# Patient Record
Sex: FEMALE | Race: BLACK OR AFRICAN AMERICAN | NOT HISPANIC OR LATINO | Employment: FULL TIME | URBAN - METROPOLITAN AREA
[De-identification: names, ages, dates, MRNs, and addresses within clinical notes are randomized per-mention and may not be internally consistent; named-entity substitution may affect disease eponyms.]

---

## 2017-10-27 ENCOUNTER — LAB SERVICES (OUTPATIENT)
Dept: LAB | Age: 37
End: 2017-10-27

## 2017-10-27 ENCOUNTER — OFFICE VISIT (OUTPATIENT)
Dept: OBGYN | Age: 37
End: 2017-10-27

## 2017-10-27 VITALS
WEIGHT: 247 LBS | DIASTOLIC BLOOD PRESSURE: 86 MMHG | BODY MASS INDEX: 36.58 KG/M2 | HEIGHT: 69 IN | HEART RATE: 86 BPM | SYSTOLIC BLOOD PRESSURE: 130 MMHG

## 2017-10-27 DIAGNOSIS — Z23 NEED FOR VACCINATION: ICD-10-CM

## 2017-10-27 DIAGNOSIS — Z11.3 SCREEN FOR STD (SEXUALLY TRANSMITTED DISEASE): ICD-10-CM

## 2017-10-27 DIAGNOSIS — Z12.4 SCREENING FOR CERVICAL CANCER: ICD-10-CM

## 2017-10-27 DIAGNOSIS — Z01.419 WELL FEMALE EXAM WITH ROUTINE GYNECOLOGICAL EXAM: Primary | ICD-10-CM

## 2017-10-27 PROCEDURE — 99385 PREV VISIT NEW AGE 18-39: CPT | Performed by: OBSTETRICS & GYNECOLOGY

## 2017-10-27 PROCEDURE — 90686 IIV4 VACC NO PRSV 0.5 ML IM: CPT | Performed by: OBSTETRICS & GYNECOLOGY

## 2017-10-27 RX ORDER — FLUCONAZOLE 150 MG/1
150 TABLET ORAL ONCE
Qty: 1 TABLET | Refills: 1 | Status: SHIPPED | OUTPATIENT
Start: 2017-10-27 | End: 2017-10-27

## 2017-10-31 LAB
C TRACH RRNA SPEC QL NAA+PROBE: NEGATIVE
N GONORRHOEA RRNA SPEC QL NAA+PROBE: NEGATIVE
SPECIMEN SOURCE: NORMAL

## 2017-11-01 LAB — HPV16+18+45 E6+E7MRNA CVX NAA+PROBE: NORMAL

## 2017-11-07 ENCOUNTER — TELEPHONE (OUTPATIENT)
Dept: OBGYN | Age: 37
End: 2017-11-07

## 2017-11-09 ENCOUNTER — OFFICE VISIT (OUTPATIENT)
Dept: OBGYN | Age: 37
End: 2017-11-09

## 2017-11-09 VITALS — BODY MASS INDEX: 36.58 KG/M2 | HEIGHT: 69 IN | HEART RATE: 104 BPM | WEIGHT: 247 LBS

## 2017-11-09 DIAGNOSIS — Z30.430 ENCOUNTER FOR INSERTION OF MIRENA IUD: Primary | ICD-10-CM

## 2017-11-09 DIAGNOSIS — Z30.430 ENCOUNTER FOR INSERTION OF PROGESTIN-RELEASING INTRAUTERINE CONTRACEPTIVE DEVICE: ICD-10-CM

## 2017-11-09 LAB
B-HCG UR QL: NEGATIVE
SP GR UR: NORMAL

## 2017-11-09 PROCEDURE — 81025 URINE PREGNANCY TEST: CPT | Performed by: OBSTETRICS & GYNECOLOGY

## 2017-11-09 PROCEDURE — 58300 INSERT INTRAUTERINE DEVICE: CPT | Performed by: OBSTETRICS & GYNECOLOGY

## 2019-01-09 ENCOUNTER — TELEPHONE (OUTPATIENT)
Dept: OBGYN | Age: 39
End: 2019-01-09

## 2019-01-09 DIAGNOSIS — R10.9 ABDOMINAL PAIN, UNSPECIFIED ABDOMINAL LOCATION: ICD-10-CM

## 2019-01-09 DIAGNOSIS — Z97.5 IUD (INTRAUTERINE DEVICE) IN PLACE: Primary | ICD-10-CM

## 2019-03-14 ENCOUNTER — OFFICE VISIT (OUTPATIENT)
Dept: PODIATRY | Age: 39
End: 2019-03-14

## 2019-03-14 VITALS
DIASTOLIC BLOOD PRESSURE: 70 MMHG | BODY MASS INDEX: 39.55 KG/M2 | WEIGHT: 252 LBS | SYSTOLIC BLOOD PRESSURE: 110 MMHG | HEIGHT: 67 IN

## 2019-03-14 DIAGNOSIS — L84 CORN OR CALLUS: Primary | ICD-10-CM

## 2019-03-14 PROCEDURE — 11056 PARNG/CUTG B9 HYPRKR LES 2-4: CPT | Performed by: PODIATRIST

## 2019-03-14 PROCEDURE — 99202 OFFICE O/P NEW SF 15 MIN: CPT | Performed by: PODIATRIST

## 2022-10-15 ENCOUNTER — HOSPITAL ENCOUNTER (EMERGENCY)
Facility: HOSPITAL | Age: 42
Discharge: HOME | End: 2022-10-15
Attending: EMERGENCY MEDICINE
Payer: MEDICARE

## 2022-10-15 VITALS
OXYGEN SATURATION: 98 % | BODY MASS INDEX: 38.14 KG/M2 | HEIGHT: 67 IN | SYSTOLIC BLOOD PRESSURE: 147 MMHG | RESPIRATION RATE: 18 BRPM | HEART RATE: 101 BPM | WEIGHT: 243 LBS | DIASTOLIC BLOOD PRESSURE: 98 MMHG | TEMPERATURE: 99 F

## 2022-10-15 DIAGNOSIS — R73.9 HYPERGLYCEMIA: Primary | ICD-10-CM

## 2022-10-15 LAB
AMPHET UR QL SCN: NEGATIVE
ANION GAP SERPL CALC-SCNC: 10 MMOL/L (ref 0–18)
APAP SERPL-MCNC: <2 UG/ML (ref 10–30)
BARBITURATES UR QL SCN: NEGATIVE
BASE EXCESS BLD CALC-SCNC: -1.3 MMOL/L (ref ?–2)
BASOPHILS # BLD AUTO: 0.06 X10(3) UL (ref 0–0.2)
BASOPHILS NFR BLD AUTO: 0.5 %
BENZODIAZ UR QL SCN: NEGATIVE
BILIRUB UR QL: NEGATIVE
BUN BLD-MCNC: 10 MG/DL (ref 7–18)
BUN/CREAT SERPL: 11.4 (ref 10–20)
CALCIUM BLD-MCNC: 9.2 MG/DL (ref 8.5–10.1)
CANNABINOIDS UR QL SCN: NEGATIVE
CHLORIDE SERPL-SCNC: 106 MMOL/L (ref 98–112)
CO2 SERPL-SCNC: 23 MMOL/L (ref 21–32)
COCAINE UR QL: NEGATIVE
COLOR UR: YELLOW
CREAT BLD-MCNC: 0.88 MG/DL
CREAT UR-SCNC: 73.8 MG/DL
DEPRECATED RDW RBC AUTO: 44.9 FL (ref 35.1–46.3)
EOSINOPHIL # BLD AUTO: 0.02 X10(3) UL (ref 0–0.7)
EOSINOPHIL NFR BLD AUTO: 0.2 %
ERYTHROCYTE [DISTWIDTH] IN BLOOD BY AUTOMATED COUNT: 14.8 % (ref 11–15)
ETHANOL SERPL-MCNC: <3 MG/DL (ref ?–3)
GFR SERPLBLD BASED ON 1.73 SQ M-ARVRAT: 84 ML/MIN/1.73M2 (ref 60–?)
GLUCOSE BLD-MCNC: 407 MG/DL (ref 70–99)
GLUCOSE BLDC GLUCOMTR-MCNC: 327 MG/DL (ref 70–99)
GLUCOSE BLDC GLUCOMTR-MCNC: 403 MG/DL (ref 70–99)
GLUCOSE UR-MCNC: >=500 MG/DL
HCO3 BLDV-SCNC: 23.8 MEQ/L (ref 22–26)
HCT VFR BLD AUTO: 39.3 %
HGB BLD-MCNC: 12.4 G/DL
IMM GRANULOCYTES # BLD AUTO: 0.07 X10(3) UL (ref 0–1)
IMM GRANULOCYTES NFR BLD: 0.5 %
KETONES UR-MCNC: 20 MG/DL
LYMPHOCYTES # BLD AUTO: 1.78 X10(3) UL (ref 1–4)
LYMPHOCYTES NFR BLD AUTO: 13.5 %
MCH RBC QN AUTO: 26.2 PG (ref 26–34)
MCHC RBC AUTO-ENTMCNC: 31.6 G/DL (ref 31–37)
MCV RBC AUTO: 82.9 FL
MDMA UR QL SCN: NEGATIVE
METHADONE UR QL SCN: NEGATIVE
MONOCYTES # BLD AUTO: 0.42 X10(3) UL (ref 0.1–1)
MONOCYTES NFR BLD AUTO: 3.2 %
NEUTROPHILS # BLD AUTO: 10.85 X10 (3) UL (ref 1.5–7.7)
NEUTROPHILS # BLD AUTO: 10.85 X10(3) UL (ref 1.5–7.7)
NEUTROPHILS NFR BLD AUTO: 82.1 %
NITRITE UR QL STRIP.AUTO: NEGATIVE
OPIATES UR QL SCN: NEGATIVE
OSMOLALITY SERPL CALC.SUM OF ELEC: 304 MOSM/KG (ref 275–295)
OXYCODONE UR QL SCN: NEGATIVE
PCO2 BLDV: 40 MM HG (ref 38–50)
PCP UR QL SCN: NEGATIVE
PH BLDV: 7.38 [PH] (ref 7.32–7.43)
PH UR: 6 [PH] (ref 5–8)
PLATELET # BLD AUTO: 402 10(3)UL (ref 150–450)
PO2 BLDV: 69 MM HG (ref 35–40)
POTASSIUM SERPL-SCNC: 3.9 MMOL/L (ref 3.5–5.1)
PROT UR-MCNC: 100 MG/DL
PUNCTURE CHARGE: NO
RBC # BLD AUTO: 4.74 X10(6)UL
SALICYLATES SERPL-MCNC: <1.7 MG/DL (ref 2.8–20)
SAO2 % BLDV: 91.8 % (ref 60–85)
SODIUM SERPL-SCNC: 139 MMOL/L (ref 136–145)
SP GR UR STRIP: 1.01 (ref 1–1.03)
TROPONIN I HIGH SENSITIVITY: 16 NG/L
UROBILINOGEN UR STRIP-ACNC: <2
VIT C UR-MCNC: NEGATIVE MG/DL
WBC # BLD AUTO: 13.2 X10(3) UL (ref 4–11)

## 2022-10-15 PROCEDURE — 93010 ELECTROCARDIOGRAM REPORT: CPT | Performed by: EMERGENCY MEDICINE

## 2022-10-15 PROCEDURE — 96360 HYDRATION IV INFUSION INIT: CPT

## 2022-10-15 PROCEDURE — 80048 BASIC METABOLIC PNL TOTAL CA: CPT

## 2022-10-15 PROCEDURE — 96361 HYDRATE IV INFUSION ADD-ON: CPT

## 2022-10-15 PROCEDURE — 93005 ELECTROCARDIOGRAM TRACING: CPT

## 2022-10-15 PROCEDURE — 82962 GLUCOSE BLOOD TEST: CPT

## 2022-10-15 PROCEDURE — 82077 ASSAY SPEC XCP UR&BREATH IA: CPT | Performed by: EMERGENCY MEDICINE

## 2022-10-15 PROCEDURE — 80307 DRUG TEST PRSMV CHEM ANLYZR: CPT | Performed by: EMERGENCY MEDICINE

## 2022-10-15 PROCEDURE — 87086 URINE CULTURE/COLONY COUNT: CPT | Performed by: EMERGENCY MEDICINE

## 2022-10-15 PROCEDURE — 85025 COMPLETE CBC W/AUTO DIFF WBC: CPT

## 2022-10-15 PROCEDURE — 99285 EMERGENCY DEPT VISIT HI MDM: CPT

## 2022-10-15 PROCEDURE — 81001 URINALYSIS AUTO W/SCOPE: CPT | Performed by: EMERGENCY MEDICINE

## 2022-10-15 PROCEDURE — 80179 DRUG ASSAY SALICYLATE: CPT | Performed by: EMERGENCY MEDICINE

## 2022-10-15 PROCEDURE — 99284 EMERGENCY DEPT VISIT MOD MDM: CPT

## 2022-10-15 PROCEDURE — 82805 BLOOD GASES W/O2 SATURATION: CPT | Performed by: EMERGENCY MEDICINE

## 2022-10-15 PROCEDURE — 80048 BASIC METABOLIC PNL TOTAL CA: CPT | Performed by: EMERGENCY MEDICINE

## 2022-10-15 PROCEDURE — 80143 DRUG ASSAY ACETAMINOPHEN: CPT | Performed by: EMERGENCY MEDICINE

## 2022-10-15 PROCEDURE — 85025 COMPLETE CBC W/AUTO DIFF WBC: CPT | Performed by: EMERGENCY MEDICINE

## 2022-10-15 PROCEDURE — 84484 ASSAY OF TROPONIN QUANT: CPT | Performed by: EMERGENCY MEDICINE

## 2022-10-15 RX ORDER — GLYBURIDE 5 MG/1
2.5 TABLET ORAL
COMMUNITY

## 2022-10-15 RX ORDER — ATORVASTATIN CALCIUM 10 MG/1
20 TABLET, FILM COATED ORAL NIGHTLY
COMMUNITY

## 2022-10-15 RX ORDER — EMPAGLIFLOZIN 25 MG/1
100 TABLET, FILM COATED ORAL
COMMUNITY

## 2022-10-15 NOTE — ED QUICK NOTES
MD Rodriguez at bedside. Meds reconciled at this time. Pt states she initially felt anxious, heart racing, clammy and stressed. Here from [place]. Brother to bedside. Orders to follow for additional fluids and lab test. Will recheck BG after fluids. Care ongoing.
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
## 2022-10-15 NOTE — ED QUICK NOTES
Pt's brother's states that statement he made in triage was misunderstood. He states he does not think pt. Will harm herself. Pt also denies si or hi.

## 2022-10-15 NOTE — ED QUICK NOTES
Pt care endorsed to this RN. @ 7239-- VBG received from RT. PH 7.38, PCOS 40, P02 69, So2 91.8  HCO3 23.8. MD to be made aware.

## 2022-10-15 NOTE — ED INITIAL ASSESSMENT (HPI)
Pt reports has not been feeling well since last night, bgl was 390, pt also reports heart palpitations denies cp/n/v/

## 2022-10-15 NOTE — ED INITIAL ASSESSMENT (HPI)
Pt's brother approached this RN and stated that the pt has been stating to him and family that pt is very depressed and stated to him, \"I only have a few days left. \" Will make charge RN and MD aware.

## 2022-10-15 NOTE — ED QUICK NOTES
Pt requesting to leave at this time. Pt states she is starting to feel anxious, light headed and hungry. Food offered by myself and family. Declined. Pt instructed to stay in bed on cardiac monitor for safety until discharged. Urine sample sent to lab. UCG negative. MD made aware. Awaiting on discharge instructions.

## 2022-10-15 NOTE — ED QUICK NOTES
Accucheck 327mg/dL. Pt showing signs of paranoia to room. Pt requesting I pull up orders and show her prior to blood draw that this was not already ordered. Pt also requesting particular sites when attempting blood draw, requesting I change out gloves multiple times despite clean hands and new gloves. Pt has not produced urine specimen. Remains to room with [facility] bakari NAQVI. Family to waiting area while consultation in progress.